# Patient Record
Sex: MALE | Race: WHITE | NOT HISPANIC OR LATINO | URBAN - METROPOLITAN AREA
[De-identification: names, ages, dates, MRNs, and addresses within clinical notes are randomized per-mention and may not be internally consistent; named-entity substitution may affect disease eponyms.]

---

## 2018-04-18 ENCOUNTER — OUTPATIENT (OUTPATIENT)
Dept: OUTPATIENT SERVICES | Facility: HOSPITAL | Age: 23
LOS: 1 days | Discharge: HOME | End: 2018-04-18
Payer: COMMERCIAL

## 2018-04-18 VITALS
WEIGHT: 225.31 LBS | SYSTOLIC BLOOD PRESSURE: 126 MMHG | HEART RATE: 71 BPM | RESPIRATION RATE: 16 BRPM | OXYGEN SATURATION: 100 % | TEMPERATURE: 98 F | DIASTOLIC BLOOD PRESSURE: 69 MMHG | HEIGHT: 74 IN

## 2018-04-18 DIAGNOSIS — S82.899A OTHER FRACTURE OF UNSPECIFIED LOWER LEG, INITIAL ENCOUNTER FOR CLOSED FRACTURE: Chronic | ICD-10-CM

## 2018-04-18 RX ORDER — HYDROMORPHONE HYDROCHLORIDE 2 MG/ML
0.5 INJECTION INTRAMUSCULAR; INTRAVENOUS; SUBCUTANEOUS ONCE
Qty: 0 | Refills: 0 | Status: DISCONTINUED | OUTPATIENT
Start: 2018-04-18 | End: 2018-04-18

## 2018-04-18 RX ORDER — ACETAMINOPHEN 500 MG
650 TABLET ORAL EVERY 6 HOURS
Qty: 0 | Refills: 0 | Status: DISCONTINUED | OUTPATIENT
Start: 2018-04-18 | End: 2018-04-19

## 2018-04-18 RX ORDER — OXYCODONE AND ACETAMINOPHEN 5; 325 MG/1; MG/1
2 TABLET ORAL EVERY 4 HOURS
Qty: 0 | Refills: 0 | Status: DISCONTINUED | OUTPATIENT
Start: 2018-04-18 | End: 2018-04-19

## 2018-04-18 RX ORDER — ONDANSETRON 8 MG/1
4 TABLET, FILM COATED ORAL ONCE
Qty: 0 | Refills: 0 | Status: DISCONTINUED | OUTPATIENT
Start: 2018-04-18 | End: 2018-04-19

## 2018-04-18 RX ADMIN — HYDROMORPHONE HYDROCHLORIDE 0.5 MILLIGRAM(S): 2 INJECTION INTRAMUSCULAR; INTRAVENOUS; SUBCUTANEOUS at 19:41

## 2018-04-18 RX ADMIN — HYDROMORPHONE HYDROCHLORIDE 0.5 MILLIGRAM(S): 2 INJECTION INTRAMUSCULAR; INTRAVENOUS; SUBCUTANEOUS at 19:00

## 2018-04-19 VITALS
HEART RATE: 99 BPM | OXYGEN SATURATION: 96 % | TEMPERATURE: 97 F | RESPIRATION RATE: 14 BRPM | SYSTOLIC BLOOD PRESSURE: 123 MMHG | DIASTOLIC BLOOD PRESSURE: 58 MMHG

## 2018-04-19 PROCEDURE — 29888 ARTHRS AID ACL RPR/AGMNTJ: CPT | Mod: RT

## 2018-04-19 PROCEDURE — C1713: CPT

## 2018-04-19 PROCEDURE — 29867 ALLGRFT IMPLNT KNEE W/SCOPE: CPT | Mod: RT

## 2018-04-19 PROCEDURE — 29868 MENISCAL TRNSPL KNEE W/SCPE: CPT | Mod: RT

## 2018-04-19 PROCEDURE — 97161 PT EVAL LOW COMPLEX 20 MIN: CPT

## 2018-04-19 PROCEDURE — 97162 PT EVAL MOD COMPLEX 30 MIN: CPT

## 2018-04-19 RX ORDER — ASPIRIN/CALCIUM CARB/MAGNESIUM 324 MG
325 TABLET ORAL DAILY
Qty: 0 | Refills: 0 | Status: DISCONTINUED | OUTPATIENT
Start: 2018-04-19 | End: 2018-04-19

## 2018-04-19 RX ADMIN — OXYCODONE AND ACETAMINOPHEN 2 TABLET(S): 5; 325 TABLET ORAL at 07:19

## 2018-04-19 RX ADMIN — Medication 325 MILLIGRAM(S): at 07:40

## 2018-04-19 RX ADMIN — OXYCODONE AND ACETAMINOPHEN 2 TABLET(S): 5; 325 TABLET ORAL at 07:49

## 2018-04-19 NOTE — PHYSICAL THERAPY INITIAL EVALUATION ADULT - ADDITIONAL COMMENTS
Patient lives with his family in an elevator apartment without steps to enter. Prior to admission, patient was independent for all functional mobility without assistive device. 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 Parents can assist patient with ADLs upon discharge

## 2018-04-19 NOTE — PHYSICAL THERAPY INITIAL EVALUATION ADULT - PERTINENT HX OF CURRENT PROBLEM, REHAB EVAL
Patient is a 22 year old Male who presents with a chief complaint of Right knee- s/p surgery with numbness in the LLE and RUE- no weakness

## 2018-04-19 NOTE — PHYSICAL THERAPY INITIAL EVALUATION ADULT - MANUAL MUSCLE TESTING RESULTS, REHAB EVAL
L ankle dorsiflexion: 5/5, L ankle plantarflexion: 5/5; L knee extension: 5/5; L knee flexion: 5/5; L hip flexion 5/5; L hip extension 5/5; BUE strength 5/5 throughout based on manual muscle testing

## 2018-04-19 NOTE — PHYSICAL THERAPY INITIAL EVALUATION ADULT - RANGE OF MOTION EXAMINATION, REHAB EVAL
bilateral upper extremity ROM was WFL (within functional limits)/Left LE ROM was WFL (within functional limits)/RLE not tested - in immobilizer

## 2018-04-19 NOTE — PHYSICAL THERAPY INITIAL EVALUATION ADULT - LEVEL OF INDEPENDENCE: STAIR NEGOTIATION, REHAB EVAL
contact guard/patient with decreased eccentric control of L knee with stairs necessitating close contact guard which parents can reasonably provide - although patient has no stairs to navigate at home

## 2018-04-19 NOTE — CONSULT NOTE ADULT - SUBJECTIVE AND OBJECTIVE BOX
Patient is a 22y old  Male who presents with a chief complaint of Right knee- S/p surgery with numbness in the LLE and RUE- no weakness      HPI:      Allergies  No Known Allergies      Health Issues  M93.20  Handoff  MEWS Score  History of surgery  Fracture, ankle        FAMILY HISTORY:      MEDICATIONS  (STANDING):  aspirin 325 milliGRAM(s) Oral daily    MEDICATIONS  (PRN):  acetaminophen   Tablet. 650 milliGRAM(s) Oral every 6 hours PRN Mild Pain (1 - 3)  ondansetron    Tablet 4 milliGRAM(s) Oral once PRN Nausea  oxyCODONE    5 mG/acetaminophen 325 mG 2 Tablet(s) Oral every 4 hours PRN Moderate Pain (4 - 6)      PAST MEDICAL & SURGICAL HISTORY:  History of surgery: right ACL repair  Fracture, ankle: left ankle with hardware      Labs              Radiology:    Physical Exam    MENTAL STATUS  -Level of Consciousness- awake    Orientation- person, place time  Language- aphasia/ dysarthria- nl  Memory- recent and remote- nl      Cranial Nerve 1- 12  Pupils- equal and reactive  Eye movements-full  Facial - no asymmetry   Lower CN-nl    Gait and Station-able to ambulate    MOTOR  Upper-nl  Lower-nl LE     Reflexes-decrease left KJ    Sensation decrease Left L4 and Right median    Cerebellar-no tremor    vascular -nl pulses    Assessment- Lumbar radiculopathy, Median nerve irritation    Plan No neuro treatment at present

## 2018-04-19 NOTE — PHYSICAL THERAPY INITIAL EVALUATION ADULT - THERAPY FREQUENCY, PT EVAL
DC PT - patient is independent for all functional mobility and ADLs with good dynamic stability and no losses of balance observed. Patient displays mildly decreased eccentric control with stair negotiation today as opposed to yesterday, despite LLE MMT being 5/5 - patient requiring close contact guard from parents which they can reasonably provide

## 2018-04-19 NOTE — PHYSICAL THERAPY INITIAL EVALUATION ADULT - GENERAL OBSERVATIONS, REHAB EVAL
Received sitting in bedside chair with +R knee immobilizer c/d/i, on room air, family present, +hep lock, in no apparent distress. Patient endorses 3/10 pain in right knee at rest

## 2020-01-13 DIAGNOSIS — M54.50 LOW BACK PAIN, UNSPECIFIED BACK PAIN LATERALITY, UNSPECIFIED CHRONICITY, UNSPECIFIED WHETHER SCIATICA PRESENT: Primary | ICD-10-CM

## 2020-01-17 ENCOUNTER — OFFICE VISIT (OUTPATIENT)
Dept: ORTHOPEDICS | Facility: CLINIC | Age: 25
End: 2020-01-17
Payer: COMMERCIAL

## 2020-01-17 ENCOUNTER — HOSPITAL ENCOUNTER (OUTPATIENT)
Dept: RADIOLOGY | Facility: HOSPITAL | Age: 25
Discharge: HOME | End: 2020-01-17
Attending: ORTHOPAEDIC SURGERY
Payer: COMMERCIAL

## 2020-01-17 VITALS — WEIGHT: 217 LBS | BODY MASS INDEX: 27.85 KG/M2 | HEIGHT: 74 IN

## 2020-01-17 DIAGNOSIS — M54.50 LOW BACK PAIN, UNSPECIFIED BACK PAIN LATERALITY, UNSPECIFIED CHRONICITY, UNSPECIFIED WHETHER SCIATICA PRESENT: ICD-10-CM

## 2020-01-17 DIAGNOSIS — M51.369 DDD (DEGENERATIVE DISC DISEASE), LUMBAR: ICD-10-CM

## 2020-01-17 DIAGNOSIS — M54.50 LOW BACK PAIN, NON-SPECIFIC: Primary | ICD-10-CM

## 2020-01-17 DIAGNOSIS — M54.16 LUMBAR RADICULOPATHY: ICD-10-CM

## 2020-01-17 PROCEDURE — 99204 OFFICE O/P NEW MOD 45 MIN: CPT | Performed by: ORTHOPAEDIC SURGERY

## 2020-01-17 PROCEDURE — 72114 X-RAY EXAM L-S SPINE BENDING: CPT

## 2020-01-17 RX ORDER — METHYLPREDNISOLONE 4 MG/1
TABLET ORAL
Qty: 21 TABLET | Refills: 0 | Status: SHIPPED | OUTPATIENT
Start: 2020-01-17

## 2020-01-17 NOTE — PROGRESS NOTES
"Subjective   Patient ID: Corey Tovar is a 24 y.o. male.    Chief Complaint : Low back pain, right leg pain    History of Present Illness: Corey Tovar is a 24 y.o. male who presents to the office today with the above stated complaints.  He states he has had a chronic history of low back pain since high school.  He states that his pain would come and go.  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  With conservative management at that time his pain went away.  Recently he was on a long car ride from [place] and this reaggravated his low back pain and right leg pain.  He states that his pain radiates from his low back down the posterior aspect of his right leg all the way to his foot.  He also experiences numbness in his foot.  He describes the pain as sharp shooting aching and stabbing.  He states that his pain is worse with sitting and walking and is relieved with standing and lying down.  He denies any activity intolerance.  He also notes that his pain is worse with coughing sneezing straining and bending forward and backward.  For his pain in the past he has trialed physical therapy and ibuprofen as well as has trialed chiropractic care within the past 6 months.  He denies any bowel or bladder changes any recent fevers chills night sweats recent trauma or illness.  Non-smoker    Past Medical History :  History reviewed. No pertinent past medical history.      Past Surgical History :   History reviewed. No pertinent surgical history.    Family History :  History reviewed. No pertinent family history.    Social History :  Social History     Social History Narrative   • Not on file       REVIEW OF SYSTEMS : All others negative other than specified in the HPI.    Physical Examination :   Visit Vitals  Ht 1.88 m (6' 2\")   Wt 98.4 kg (217 lb)   BMI 27.86 kg/m²       Constitutional: The patient appears well " developed and well nourished.  No acute distress.    Eyes:  Sclear white.    Cardiovascular:  Pedal pulses 2+ and equal bilaterally.    Extremities:  No clubbing, cyanosis, or edema.    Lymphatic:  No palpable or visable lymphadenopathy.    Skin:  No scars, rashes, lesions, or ecchymosis, except as noted under musculoskeletal exam.    Neuro:  Sensation intact to light touch.  Achillies and patellar deep tendon reflexes are brisk and symmetrical.  Coordination normal.    Psych:  Mood/affect: mood is normal, affect is normal.  Cognition: orientation is intact to person, place, time.      Musculoskeletal: Patient has full stable range of motion of lumbar spine.  There is mild pain with range of motion.  Mild pain with forward flexion or extension.  No pain to palpation over the paraspinal musculature.  No pain to palpation over any bony prominence.  No pain with range of motion of bilateral hips and they are stable.  No pain to palpation over the peritrochanteric region bilaterally.  There is a positive straight leg raise right-sided, negative left.  Overall neurovascular intact in bilateral lower extremities and is full strength at 5 out of 5 throughout.  Negative clonus and downgoing Babinski.  Walks with a normal gait with no assisted device.    IMAGING : x-rays of the lumbar spine taken on 1/17/2020 were personally reviewed in the office today.  This demonstrates degenerative disc disease most significant at L5-S1.  No fractures lesions or instability appreciated.    X-ray report reviewed    The patient was reminded to obtain all imaging/diagnostic reports and to review all nonspinal abnormalities with her medical care provider, i.e., Primary care physician or general practitioner.     It was emphasized to the patient to ensure all diagnostic abnormalities that are present outside of the spinal related findings warrant a discussion with their primary medical doctor to ensure adequate and/or further diagnostic  treatment, evaluation, and/or consultation with an appropriate medical professional.    IMPRESSION : 1.  Low back pain  2.  Lumbar degenerative disc disease  3.  Lumbar radiculopathy    PLAN : At this time the patient is failed conservative management.  He was given a prescription for an MRI of his lumbar spine.  He was also provided with a prescription for Medrol Dosepak.  He was instructed not to drink alcohol while taking this medication.  He was also instructed not to take ibuprofen while taking this medication.  He was instructed that he may resume ibuprofen once he has completed the Medrol Dosepak and continue the use of this under guidance of his PCP.  He was also provided with a prescription for physical therapy to evaluate and treat his lumbar spine.  He was instructed to follow-up in the office once he obtains the MRI, however may return sooner should his condition worsen.  Should this treatment not provide him with significant relief we will consider epidural management at his next office appointment    LIONEL Vieira  1/17/2020  8:35 AM